# Patient Record
Sex: MALE | Race: WHITE | Employment: OTHER | URBAN - METROPOLITAN AREA
[De-identification: names, ages, dates, MRNs, and addresses within clinical notes are randomized per-mention and may not be internally consistent; named-entity substitution may affect disease eponyms.]

---

## 2019-04-01 ENCOUNTER — HOSPITAL ENCOUNTER (OUTPATIENT)
Dept: GENERAL RADIOLOGY | Age: 59
Discharge: HOME | End: 2019-04-01
Payer: MEDICARE

## 2019-04-01 ENCOUNTER — HOSPITAL ENCOUNTER (OUTPATIENT)
Age: 59
Discharge: HOME | End: 2019-04-01
Payer: MEDICARE

## 2019-04-01 DIAGNOSIS — G89.29 CHRONIC PAIN OF RIGHT KNEE: ICD-10-CM

## 2019-04-01 DIAGNOSIS — R06.2 WHEEZING: ICD-10-CM

## 2019-04-01 DIAGNOSIS — M25.561 CHRONIC PAIN OF RIGHT KNEE: ICD-10-CM

## 2019-04-01 PROCEDURE — 73560 X-RAY EXAM OF KNEE 1 OR 2: CPT

## 2019-04-01 PROCEDURE — 71046 X-RAY EXAM CHEST 2 VIEWS: CPT

## 2019-04-18 ENCOUNTER — OFFICE VISIT (OUTPATIENT)
Dept: ORTHOPEDIC SURGERY | Age: 59
End: 2019-04-18
Payer: MEDICARE

## 2019-04-18 VITALS — WEIGHT: 175 LBS | RESPIRATION RATE: 12 BRPM | HEIGHT: 67 IN | BODY MASS INDEX: 27.47 KG/M2

## 2019-04-18 DIAGNOSIS — M23.91 LOCKING OF RIGHT KNEE: ICD-10-CM

## 2019-04-18 DIAGNOSIS — S83.241A ACUTE MEDIAL MENISCUS TEAR OF RIGHT KNEE, INITIAL ENCOUNTER: Primary | ICD-10-CM

## 2019-04-18 DIAGNOSIS — M25.561 ACUTE PAIN OF RIGHT KNEE: ICD-10-CM

## 2019-04-18 PROCEDURE — 99243 OFF/OP CNSLTJ NEW/EST LOW 30: CPT | Performed by: ORTHOPAEDIC SURGERY

## 2019-04-18 RX ORDER — IBUPROFEN 800 MG/1
1 TABLET ORAL
COMMUNITY
Start: 2019-04-01 | End: 2019-11-07

## 2019-04-18 NOTE — PROGRESS NOTES
KNEE VISIT      HISTORY OF PRESENT ILLNESS    Martinez Leon is a 60 y.o. male who presents for consultation at request of Cindy Morgan PA-C, for right knee pain. He's had for last few weeks. He said he had an injury to his knee in 1965, but over the last few weeks it had increasingly severe pain which is constant. There is grade 10 over 10 and worsening and accompanied by stiffness swelling instability and weakness. He's been using a crutch, get around, straining his leg. ROS    Well documented in patient history form dated 4/18/19  All other ROS negative except for above. Past Surgical history    No past surgical history on file. PAST MEDICAL    No past medical history on file. Allergies    No Known Allergies    Meds    Current Outpatient Medications   Medication Sig Dispense Refill    ibuprofen (IBU) 800 MG tablet Take 1 tablet by mouth       No current facility-administered medications for this visit.         Social    Social History     Socioeconomic History    Marital status:      Spouse name: Not on file    Number of children: Not on file    Years of education: Not on file    Highest education level: Not on file   Occupational History    Not on file   Social Needs    Financial resource strain: Not on file    Food insecurity:     Worry: Not on file     Inability: Not on file    Transportation needs:     Medical: Not on file     Non-medical: Not on file   Tobacco Use    Smoking status: Not on file   Substance and Sexual Activity    Alcohol use: Not on file    Drug use: Not on file    Sexual activity: Not on file   Lifestyle    Physical activity:     Days per week: Not on file     Minutes per session: Not on file    Stress: Not on file   Relationships    Social connections:     Talks on phone: Not on file     Gets together: Not on file     Attends Sikh service: Not on file     Active member of club or organization: Not on file     Attends meetings of clubs or organizations: Not on file     Relationship status: Not on file    Intimate partner violence:     Fear of current or ex partner: Not on file     Emotionally abused: Not on file     Physically abused: Not on file     Forced sexual activity: Not on file   Other Topics Concern    Not on file   Social History Narrative    Not on file       Family HISTORY    No family history on file. PHYSICAL EXAM    Vital Signs:  Resp 12   Ht 5' 7\" (1.702 m)   Wt 175 lb (79.4 kg)   BMI 27.41 kg/m²   General Appearance:  Normal body habitus. Alert and oriented to person, place, and time. Affect:  Normal.   Gait:  Very antalgic flexed knee gait, using a crutch. Good balance and coordination. Skin:  Intact. Sensation: Limited by pain. Strength:  Intact. Reflexes:  Intact. Pulses:  Intact. Knee Exam:    Effusion:  2+    Range of Motion Right Left   Extension -10 0   Flexion 80 135     Provocative Test Right Left    Positive Negative Positive Negative   Anterior drawer [] [x] [] [x]   Lachman [] [x] [] [x]   Posterior drawer [] [x] [] [x]   Varus testing [x] [] [] [x]   Valgus testing [x] [] [] [x]   Joint line tenderness [x] [] [] [x]     Additional Exam Comments:  His neurocirculatory lymphatic exam otherwise is normal symmetric both lower extremities. He has exquisite medial joint line tenderness with crepitus consistent with a medial meniscus tear. He has excruciating pain with attempts at extension past -10 and flexion past 80. He also has exquisite pain with Radha's maneuver and valgus stress. All is consistent with medial meniscus tearing. IMAGING STUDIES    X-rays 2 views right knee taken to [facility] were unremarkable    IMPRESSION    Right knee pain secondary to acute medial meniscus tear with locking    PLAN      1. Conservative care options including physical therapy, NSAIDs, bracing, and activity modification were discussed.    2.  The indications for therapeutic injections were discussed. 3.  The indications for additional imaging studies were discussed. 4.  After considering the various options discussed, the patient elected to pursue a course that includes keeping him on crutches, and have him continue with his anti-inflammatory which is helping minimally. He should obtain an MRI of the right knee and return after testing for disposition. I would anticipate based upon my exam and his history that he will undoubtedly benefit from arthroscopic intervention. I do not feel that injections will be in his best interest as it would only delay the ability for me to perform surgery if warranted.

## 2019-04-29 DIAGNOSIS — M23.91 LOCKING OF RIGHT KNEE: ICD-10-CM

## 2019-04-29 DIAGNOSIS — M25.561 ACUTE PAIN OF RIGHT KNEE: ICD-10-CM

## 2019-04-29 DIAGNOSIS — S83.241A ACUTE MEDIAL MENISCUS TEAR OF RIGHT KNEE, INITIAL ENCOUNTER: Primary | ICD-10-CM

## 2019-07-05 ENCOUNTER — OFFICE VISIT (OUTPATIENT)
Dept: ORTHOPEDIC SURGERY | Age: 59
End: 2019-07-05
Payer: MEDICARE

## 2019-07-05 DIAGNOSIS — M23.91 LOCKING OF RIGHT KNEE: ICD-10-CM

## 2019-07-05 DIAGNOSIS — S83.241A ACUTE MEDIAL MENISCUS TEAR OF RIGHT KNEE, INITIAL ENCOUNTER: Primary | ICD-10-CM

## 2019-07-05 DIAGNOSIS — M25.561 ACUTE PAIN OF RIGHT KNEE: ICD-10-CM

## 2019-07-05 PROCEDURE — 4004F PT TOBACCO SCREEN RCVD TLK: CPT | Performed by: ORTHOPAEDIC SURGERY

## 2019-07-05 PROCEDURE — G8427 DOCREV CUR MEDS BY ELIG CLIN: HCPCS | Performed by: ORTHOPAEDIC SURGERY

## 2019-07-05 PROCEDURE — 99213 OFFICE O/P EST LOW 20 MIN: CPT | Performed by: ORTHOPAEDIC SURGERY

## 2019-07-05 PROCEDURE — 3017F COLORECTAL CA SCREEN DOC REV: CPT | Performed by: ORTHOPAEDIC SURGERY

## 2019-07-05 PROCEDURE — G8419 CALC BMI OUT NRM PARAM NOF/U: HCPCS | Performed by: ORTHOPAEDIC SURGERY

## 2019-07-05 RX ORDER — ALBUTEROL SULFATE 90 UG/1
AEROSOL, METERED RESPIRATORY (INHALATION)
COMMUNITY
Start: 2019-05-16

## 2019-07-05 RX ORDER — KETOROLAC TROMETHAMINE 10 MG/1
10 TABLET, FILM COATED ORAL EVERY 6 HOURS PRN
Qty: 20 TABLET | Refills: 0 | Status: SHIPPED | OUTPATIENT
Start: 2019-07-05 | End: 2019-11-07

## 2019-07-05 NOTE — PROGRESS NOTES
KNEE VISIT      HISTORY OF PRESENT ILLNESS    Grzegorz Wilkes is a 60 y.o. male who presents for reevaluation of his right knee. The character and intensity of the pain remain the same. He has had his MRI. He is here for review and disposition. ROS    Well documented in patient history form dated 4/18/19  All other ROS negative except for above. Past Surgical history    No past surgical history on file. PAST MEDICAL    No past medical history on file. Allergies    No Known Allergies    Meds    Current Outpatient Medications   Medication Sig Dispense Refill    albuterol sulfate  (90 Base) MCG/ACT inhaler       ibuprofen (IBU) 800 MG tablet Take 1 tablet by mouth       No current facility-administered medications for this visit.         Social    Social History     Socioeconomic History    Marital status:      Spouse name: Not on file    Number of children: Not on file    Years of education: Not on file    Highest education level: Not on file   Occupational History    Not on file   Social Needs    Financial resource strain: Not on file    Food insecurity:     Worry: Not on file     Inability: Not on file    Transportation needs:     Medical: Not on file     Non-medical: Not on file   Tobacco Use    Smoking status: Not on file   Substance and Sexual Activity    Alcohol use: Not on file    Drug use: Not on file    Sexual activity: Not on file   Lifestyle    Physical activity:     Days per week: Not on file     Minutes per session: Not on file    Stress: Not on file   Relationships    Social connections:     Talks on phone: Not on file     Gets together: Not on file     Attends Jewish service: Not on file     Active member of club or organization: Not on file     Attends meetings of clubs or organizations: Not on file     Relationship status: Not on file    Intimate partner violence:     Fear of current or ex partner: Not on file     Emotionally abused: Not on file

## 2019-07-05 NOTE — LETTER
[facility]  [address]  Phone: [phone]  Fax: [phone]    Meng Agrawal MD        2019    Brandi Sharp  [facility] & [address] [address]   1960  DOS 2019      KNEE VISIT      HISTORY OF PRESENT ILLNESS    Brandi Sharp is a 60 y.o. male who presents for reevaluation of his right knee. The character and intensity of the pain remain the same. He has had his MRI. He is here for review and disposition. ROS    Well documented in patient history form dated 19  All other ROS negative except for above. Past Surgical history    No past surgical history on file. PAST MEDICAL    No past medical history on file. Allergies    No Known Allergies    Meds    Current Outpatient Medications   Medication Sig Dispense Refill    albuterol sulfate  (90 Base) MCG/ACT inhaler       ibuprofen (IBU) 800 MG tablet Take 1 tablet by mouth       No current facility-administered medications for this visit.         Social    Social History     Socioeconomic History    Marital status:      Spouse name: Not on file    Number of children: Not on file    Years of education: Not on file    Highest education level: Not on file   Occupational History    Not on file   Social Needs    Financial resource strain: Not on file    Food insecurity:     Worry: Not on file     Inability: Not on file    Transportation needs:     Medical: Not on file     Non-medical: Not on file   Tobacco Use    Smoking status: Not on file   Substance and Sexual Activity    Alcohol use: Not on file    Drug use: Not on file    Sexual activity: Not on file   Lifestyle    Physical activity:     Days per week: Not on file     Minutes per session: Not on file    Stress: Not on file   Relationships    Social connections:     Talks on phone: Not on file     Gets together: Not on file     Attends Yazdanism service: Not on file and activity modification were discussed. 2.  The indications for therapeutic injections were discussed. 3.  The indications for additional imaging studies were discussed. 4.  After considering the various options discussed, the patient elected to pursue a course that includes proceeding with arthroscopic intervention to the right knee to include partial medial meniscectomy. INFORMED CONSENT NOTE        We discussed the risks, benefits, and alternatives to the proposed procedure, as well as the necessity of other members of the healthcare team participating in the procedure. All questions were answered and the patient elected to proceed with the proposed procedure and signed the informed consent form. Alvina Jaimes.  MD Miguel A Davila MD

## 2019-10-29 ENCOUNTER — OFFICE VISIT (OUTPATIENT)
Dept: PULMONOLOGY | Age: 59
End: 2019-10-29
Payer: MEDICAID

## 2019-10-29 VITALS
OXYGEN SATURATION: 99 % | WEIGHT: 171.1 LBS | RESPIRATION RATE: 17 BRPM | BODY MASS INDEX: 26.85 KG/M2 | HEIGHT: 67 IN | HEART RATE: 68 BPM

## 2019-10-29 DIAGNOSIS — R05.3 CHRONIC COUGH: Primary | ICD-10-CM

## 2019-10-29 DIAGNOSIS — R06.02 SHORTNESS OF BREATH: ICD-10-CM

## 2019-10-29 PROCEDURE — 99244 OFF/OP CNSLTJ NEW/EST MOD 40: CPT | Performed by: INTERNAL MEDICINE

## 2019-10-29 RX ORDER — PREDNISONE 10 MG/1
TABLET ORAL
Qty: 120 TABLET | Refills: 0 | Status: SHIPPED | OUTPATIENT
Start: 2019-10-29

## 2019-10-29 RX ORDER — OMEPRAZOLE 20 MG/1
20 CAPSULE, DELAYED RELEASE ORAL DAILY
Qty: 30 CAPSULE | Refills: 3 | Status: SHIPPED | OUTPATIENT
Start: 2019-10-29

## 2019-11-07 ENCOUNTER — TELEPHONE (OUTPATIENT)
Dept: PULMONOLOGY | Age: 59
End: 2019-11-07

## 2019-11-07 ENCOUNTER — OFFICE VISIT (OUTPATIENT)
Dept: PULMONOLOGY | Age: 59
End: 2019-11-07
Payer: MEDICAID

## 2019-11-07 VITALS
WEIGHT: 171.6 LBS | BODY MASS INDEX: 26.93 KG/M2 | SYSTOLIC BLOOD PRESSURE: 118 MMHG | HEIGHT: 67 IN | RESPIRATION RATE: 20 BRPM | HEART RATE: 78 BPM | DIASTOLIC BLOOD PRESSURE: 62 MMHG | OXYGEN SATURATION: 98 %

## 2019-11-07 DIAGNOSIS — R05.3 CHRONIC COUGH: Primary | ICD-10-CM

## 2019-11-07 PROCEDURE — 99214 OFFICE O/P EST MOD 30 MIN: CPT | Performed by: INTERNAL MEDICINE

## 2019-11-11 ENCOUNTER — HOSPITAL ENCOUNTER (OUTPATIENT)
Dept: CT IMAGING | Age: 59
Discharge: HOME | End: 2019-11-11
Payer: MEDICAID

## 2019-11-11 DIAGNOSIS — R05.3 CHRONIC COUGH: ICD-10-CM

## 2019-11-11 PROCEDURE — 71250 CT THORAX DX C-: CPT

## 2019-11-12 NOTE — TELEPHONE ENCOUNTER
Notes recorded by Elissa Mora MA on 11/11/2019 at 2:59 PM EST  Pt was informed of results with a verbal understanding.  ------    Notes recorded by Yury Peterson MA on 11/11/2019 at 2:56 PM EST  Spoke with [facility] requested for imaging to be pushed to PACS. Rashad Farmaria l with [facility] at radiology and requested comparison.  Watch for result .  ------    Notes recorded by Julia Stephens MD on 11/11/2019 at 2:37 PM EST  Tell pt there are no findings on his CT that explain his cough. He should have fiberoptic bronchoscopy as scheduled. Please get disk from [facility] and take to radiology for comparison.

## 2019-11-14 ENCOUNTER — HOSPITAL ENCOUNTER (OUTPATIENT)
Age: 59
Setting detail: OUTPATIENT SURGERY
Discharge: HOME | End: 2019-11-14
Attending: INTERNAL MEDICINE | Admitting: INTERNAL MEDICINE
Payer: MEDICAID

## 2019-11-14 VITALS
HEART RATE: 72 BPM | DIASTOLIC BLOOD PRESSURE: 56 MMHG | OXYGEN SATURATION: 97 % | WEIGHT: 170 LBS | SYSTOLIC BLOOD PRESSURE: 107 MMHG | BODY MASS INDEX: 26.68 KG/M2 | HEIGHT: 67 IN | TEMPERATURE: 97.8 F | RESPIRATION RATE: 16 BRPM

## 2019-11-14 LAB
APPEARANCE BAL (LAVAGE): ABNORMAL
CLOT EVALUATION BAL: ABNORMAL
COLOR LAVAGE: ABNORMAL
LYMPHOCYTES, BAL: 20 % (ref 5–10)
MACROPHAGES, BAL: 20 % (ref 90–95)
NUMBER OF CELLS COUNTED BAL (LAVAGE): 100
RBC, BAL: ABNORMAL /CUMM
SEGMENTED NEUTROPHILS, BAL: 60 % (ref 5–10)
WBC/EPI CELLS BAL: 130 /CUMM

## 2019-11-14 PROCEDURE — 3609011100 HC BRONCHOSCOPY BRUSHINGS: Performed by: INTERNAL MEDICINE

## 2019-11-14 PROCEDURE — 7100000010 HC PHASE II RECOVERY - FIRST 15 MIN: Performed by: INTERNAL MEDICINE

## 2019-11-14 PROCEDURE — 87015 SPECIMEN INFECT AGNT CONCNTJ: CPT

## 2019-11-14 PROCEDURE — 99152 MOD SED SAME PHYS/QHP 5/>YRS: CPT | Performed by: INTERNAL MEDICINE

## 2019-11-14 PROCEDURE — 88305 TISSUE EXAM BY PATHOLOGIST: CPT

## 2019-11-14 PROCEDURE — 31623 DX BRONCHOSCOPE/BRUSH: CPT | Performed by: INTERNAL MEDICINE

## 2019-11-14 PROCEDURE — 31625 BRONCHOSCOPY W/BIOPSY(S): CPT | Performed by: INTERNAL MEDICINE

## 2019-11-14 PROCEDURE — 87206 SMEAR FLUORESCENT/ACID STAI: CPT

## 2019-11-14 PROCEDURE — 88342 IMHCHEM/IMCYTCHM 1ST ANTB: CPT

## 2019-11-14 PROCEDURE — 87070 CULTURE OTHR SPECIMN AEROBIC: CPT

## 2019-11-14 PROCEDURE — 87102 FUNGUS ISOLATION CULTURE: CPT

## 2019-11-14 PROCEDURE — 89051 BODY FLUID CELL COUNT: CPT

## 2019-11-14 PROCEDURE — 7100000011 HC PHASE II RECOVERY - ADDTL 15 MIN: Performed by: INTERNAL MEDICINE

## 2019-11-14 PROCEDURE — 87205 SMEAR GRAM STAIN: CPT

## 2019-11-14 PROCEDURE — 2709999900 HC NON-CHARGEABLE SUPPLY: Performed by: INTERNAL MEDICINE

## 2019-11-14 PROCEDURE — 88341 IMHCHEM/IMCYTCHM EA ADD ANTB: CPT

## 2019-11-14 PROCEDURE — 87077 CULTURE AEROBIC IDENTIFY: CPT

## 2019-11-14 PROCEDURE — 88112 CYTOPATH CELL ENHANCE TECH: CPT

## 2019-11-14 PROCEDURE — 31624 DX BRONCHOSCOPE/LAVAGE: CPT | Performed by: INTERNAL MEDICINE

## 2019-11-14 PROCEDURE — 99153 MOD SED SAME PHYS/QHP EA: CPT | Performed by: INTERNAL MEDICINE

## 2019-11-14 PROCEDURE — 3609010800 HC BRONCHOSCOPY ALVEOLAR LAVAGE: Performed by: INTERNAL MEDICINE

## 2019-11-14 PROCEDURE — 3609011300 HC BRONCHOSCOPY BRONCHIAL/ENDOBRNCL BX 1+ SITES: Performed by: INTERNAL MEDICINE

## 2019-11-14 PROCEDURE — 87116 MYCOBACTERIA CULTURE: CPT

## 2019-11-14 PROCEDURE — 87186 SC STD MICRODIL/AGAR DIL: CPT

## 2019-11-14 PROCEDURE — 88312 SPECIAL STAINS GROUP 1: CPT

## 2019-11-14 PROCEDURE — 6360000002 HC RX W HCPCS: Performed by: INTERNAL MEDICINE

## 2019-11-14 RX ORDER — FENTANYL CITRATE 50 UG/ML
INJECTION, SOLUTION INTRAMUSCULAR; INTRAVENOUS PRN
Status: DISCONTINUED | OUTPATIENT
Start: 2019-11-14 | End: 2019-11-14 | Stop reason: ALTCHOICE

## 2019-11-14 RX ORDER — MIDAZOLAM HYDROCHLORIDE 5 MG/ML
INJECTION INTRAMUSCULAR; INTRAVENOUS PRN
Status: DISCONTINUED | OUTPATIENT
Start: 2019-11-14 | End: 2019-11-14 | Stop reason: ALTCHOICE

## 2019-11-14 ASSESSMENT — PAIN SCALES - GENERAL
PAINLEVEL_OUTOF10: 0

## 2019-11-14 ASSESSMENT — PAIN - FUNCTIONAL ASSESSMENT: PAIN_FUNCTIONAL_ASSESSMENT: 0-10

## 2019-11-16 LAB
CULTURE, RESPIRATORY: ABNORMAL
GRAM STAIN RESULT: ABNORMAL
GRAM STAIN RESULT: ABNORMAL
ORGANISM: ABNORMAL
ORGANISM: ABNORMAL

## 2019-11-16 RX ORDER — CEFDINIR 300 MG/1
300 CAPSULE ORAL 2 TIMES DAILY
Qty: 14 CAPSULE | Refills: 0 | Status: SHIPPED | OUTPATIENT
Start: 2019-11-16 | End: 2019-11-23

## 2019-11-21 NOTE — TELEPHONE ENCOUNTER
Spoke to [facility] at Dr. Yovani Calvillo office. States patient is scheduled for 11/25/19. Patient does not have insurance and may be an issue for patient(can't be seen per office). Patient is aware and working with  to get assistance.

## 2019-12-10 NOTE — TELEPHONE ENCOUNTER
Left message with Ayush Velásquez to follow up if patient had surgery on 12/5/19 as planned. Office has not received notes to date from Dr. Ranjeet Benavides office.

## 2019-12-16 LAB
FUNGUS (MYCOLOGY) CULTURE: ABNORMAL
FUNGUS (MYCOLOGY) CULTURE: NORMAL
FUNGUS STAIN: ABNORMAL
FUNGUS STAIN: NORMAL
ORGANISM: ABNORMAL

## 2019-12-31 LAB
AFB CULTURE (MYCOBACTERIA): NORMAL
AFB CULTURE (MYCOBACTERIA): NORMAL
AFB SMEAR: NORMAL
AFB SMEAR: NORMAL

## 2020-01-16 NOTE — TELEPHONE ENCOUNTER
Received records and scanned into epic bronch completed on 1/20/20. Patient does not have future follow up scheduled to date. Okay to close encounter?

## 2020-08-24 ENCOUNTER — HOSPITAL ENCOUNTER (EMERGENCY)
Age: 60
Discharge: HOME | End: 2020-08-25
Attending: EMERGENCY MEDICINE
Payer: COMMERCIAL

## 2020-08-24 LAB
ANISOCYTOSIS: ABNORMAL
APTT: 25.4 SEC (ref 24.2–36.2)
HCT VFR BLD CALC: 23.3 % (ref 40.5–52.5)
HEMATOLOGY PATH CONSULT: YES
HEMOGLOBIN: 8.2 G/DL (ref 13.5–17.5)
INR BLD: 0.91 (ref 0.86–1.14)
MCH RBC QN AUTO: 32 PG (ref 26–34)
MCHC RBC AUTO-ENTMCNC: 35.2 G/DL (ref 31–36)
MCV RBC AUTO: 91 FL (ref 80–100)
PDW BLD-RTO: 19.5 % (ref 12.4–15.4)
PLATELET # BLD: 14 K/UL (ref 135–450)
PMV BLD AUTO: 11.1 FL (ref 5–10.5)
POIKILOCYTES: ABNORMAL
PROTHROMBIN TIME: 10.6 SEC (ref 10–13.2)
RBC # BLD: 2.56 M/UL (ref 4.2–5.9)
WBC # BLD: 0.4 K/UL (ref 4–11)

## 2020-08-24 PROCEDURE — 85025 COMPLETE CBC W/AUTO DIFF WBC: CPT

## 2020-08-24 PROCEDURE — 85610 PROTHROMBIN TIME: CPT

## 2020-08-24 PROCEDURE — 86900 BLOOD TYPING SEROLOGIC ABO: CPT

## 2020-08-24 PROCEDURE — 99283 EMERGENCY DEPT VISIT LOW MDM: CPT

## 2020-08-24 PROCEDURE — 86850 RBC ANTIBODY SCREEN: CPT

## 2020-08-24 PROCEDURE — 85730 THROMBOPLASTIN TIME PARTIAL: CPT

## 2020-08-24 PROCEDURE — 86901 BLOOD TYPING SEROLOGIC RH(D): CPT

## 2020-08-25 ENCOUNTER — APPOINTMENT (OUTPATIENT)
Dept: GENERAL RADIOLOGY | Age: 60
End: 2020-08-25
Payer: COMMERCIAL

## 2020-08-25 VITALS
OXYGEN SATURATION: 97 % | RESPIRATION RATE: 16 BRPM | HEART RATE: 105 BPM | SYSTOLIC BLOOD PRESSURE: 118 MMHG | DIASTOLIC BLOOD PRESSURE: 75 MMHG | TEMPERATURE: 98.8 F

## 2020-08-25 LAB
ABO/RH: NORMAL
ANTIBODY SCREEN: NORMAL
ANTIBODY SCREEN: NORMAL
HEMATOLOGY PATH CONSULT: NORMAL

## 2020-08-25 PROCEDURE — 71045 X-RAY EXAM CHEST 1 VIEW: CPT

## 2020-08-25 NOTE — ED PROVIDER NOTES
[address] [facility] ED  eMERGENCY dEPARTMENT eNCOUnter      Pt Name: Juliane Miller  MRN: [id number]  Date of evaluation: 8/24/2020      Chief Complaint:    Chief Complaint   Patient presents with    Vascular Access Problem     Pt received chemo today at [facility], came home and PICC line started bleeding. HPI:  This is a  63 y.o. male with a history of leukemia who presents to the emergency department via private vehicle patient comes in with a complaint of bleeding to the right upper extremity PICC line that began after he finished chemotherapy at [facility] today. Physical Exam: (Pertinent Negatives and Positives)      GENERAL APPEARANCE: Awake and alert. Cooperative. No acute distress. Mild jaundice. HEAD: Normocephalic. Atraumatic. EYES: PERRL. EOM's grossly intact. ENT: Mucous membranes are moist.   NECK: Supple. Normal ROM. CHEST: Equal symmetric chest rise. RRR  LUNGS: Breathing is unlabored. Speaking comfortably in full sentences. LCAB  Abdomen: Nondistended  EXTREMITIES: MAEE. No acute deformities. Right upper extremity upper arm with PICC line with bleeding contained under Tegaderm dressing. SKIN: Warm and dry. NEUROLOGICAL: Alert and oriented. Strength is 5/5 in all extremities and sensation is intact. PLAN: Ordered initial CBC coags and type and screen anticipating that provider who picks up this patient may decide that the patient needs further labs ordered. LABS:   Labs Reviewed   CBC WITH AUTO DIFFERENTIAL   PROTIME-INR   APTT   TYPE AND SCREEN     Radiology Studies:   No orders to display       Plan: Suzette Harkins will complete work-up and disposition planning. Patient was seen by me in triage.  Please see the full history, physical and final disposition note by the other provider in main emergency department    (Please note sections of this note were completed with a voice recognition program.  Efforts were made to edit the dictations but occasionally words are mis-transcribed.)    Electronically signed, Kei Louie PA-C,         Kei Louie PA-C  08/24/20 [id number]

## 2020-08-25 NOTE — ED PROVIDER NOTES
delayed release capsule Take 1 capsule by mouth Daily 10/29/19   Mendel Sanabria MD   albuterol sulfate  (90 Base) MCG/ACT inhaler  19   Historical Provider, MD       Allergies as of 2020    (No Known Allergies)       Past Medical History:   Diagnosis Date    Arthritis     Leukemia [facility])         Surgical History:   Past Surgical History:   Procedure Laterality Date    ANKLE SURGERY      BRONCHOSCOPY N/A 2019    BRONCHOSCOPY ALVEOLAR LAVAGE performed by Mendel Sanabria MD at [address] [place] [address]  2019    BRONCHOSCOPY BIOPSY BRONCHUS performed by Mendel Sanabria MD at [address] [place] [address]  2019    BRONCHOSCOPY BRUSHINGS performed by Mendel Sanabria MD at [place] [address]          Family History:    Family History   Problem Relation Age of Onset    Sleep Apnea Father        Social History     Socioeconomic History    Marital status:      Spouse name: Not on file    Number of children: Not on file    Years of education: Not on file    Highest education level: Not on file   Occupational History    Not on file   Social Needs    Financial resource strain: Not on file    Food insecurity     Worry: Not on file     Inability: Not on file    Transportation needs     Medical: Not on file     Non-medical: Not on file   Tobacco Use    Smoking status: Former Smoker     Packs/day: 0.50     Years: 5.00     Pack years: 2.50     Types: Cigarettes     Last attempt to quit: 1985     Years since quittin.6    Smokeless tobacco: Never Used    Tobacco comment: pt states he was not a heavy smoker, pt states he smoked on and off    Substance and Sexual Activity    Alcohol use: Not Currently    Drug use: Not Currently     Types: Marijuana    Sexual activity: Not on file   Lifestyle    Physical activity     Days per week: Not on file     Minutes per session: Not on file    Stress: Not on file   Relationships    Social connections     Talks on phone: Not on file     Gets together: Not on file     Attends Mu-ism service: Not on file     Active member of club or organization: Not on file     Attends meetings of clubs or organizations: Not on file     Relationship status: Not on file    Intimate partner violence     Fear of current or ex partner: Not on file     Emotionally abused: Not on file     Physically abused: Not on file     Forced sexual activity: Not on file   Other Topics Concern    Not on file   Social History Narrative    Not on file       Nursing notes reviewed. ED Triage Vitals [08/24/20 2151]   Enc Vitals Group      BP (!) 107/54      Pulse 99      Resp 16      Temp 99.8 °F (37.7 °C)      Temp Source Oral      SpO2 97 %      Weight       Height       Head Circumference       Peak Flow       Pain Score       Pain Loc       Pain Edu? Excl. in [address]? GENERAL:    Awake, alert. Well developed, well nourished with no apparent distress. Nontoxic-appearing, ill-appearing. HENT:    Normocephalic, Atraumatic, no lacerations. No ENT exam due to PPE. EYES:    Conjunctiva normal,   Pupils equal round and reactive to light,   Extraocular movements normal.  NECK:    Trachea is midline. No stridor. CHEST:    Regular rate and regular rhythm, no murmurs/rubs/gallops,   normal S1/S2, chest wall non-tender. LUNGS:    No respiratory distress. No abdominal retractions, no sternal retractions. Clear to auscultation bilaterally, no wheezing, no rhochi, no rales  Speaking comfortably in full sentences  ABDOMEN:    Soft, non-tender, non-distended. No guarding. No rebound. EXTREMITIES:   Moves extremities x4 with purpose. Normal range of motion, no edema,   no tenderness, no deformity,   distal pulses present and equal bilaterally.     On the right arm there is a PICC line in place, there is some blood underneath the Tegaderm and soaking the blue [place] however did not appear to be any active bleeding, palpation of the right upper extremity does not reveal any indurated compartments, the tissue is soft. There is no pain or tenderness associated palpation over the PICC line area  SKIN:    Warm, dry and intact. NEUROLOGIC:  Normal mental status. Moving all extremities to command. Alert and oriented x4   without focal motor deficit or gross sensory deficit. Normal speech. PSYCHIATRIC:  Not anxious,   normal mood and affect,   thoughts are linear and organized,   without delusions/hallucinations,   responds appropriately to questions      LABS and DIAGNOSTIC RESULTS    RADIOLOGY  X-RAYS:  I have reviewed radiologic plain film image(s). ALL OTHER NON-PLAIN FILM IMAGES SUCH AS CT, ULTRASOUND AND MRI HAVE BEEN READ BY THE RADIOLOGIST. XR CHEST PORTABLE   Final Result   PICC in good position. No evidence of acute cardiopulmonary process.               Chest X-Ray  Interpreted by: Emergency Department Physician  View: Portable chest xray  Findings: No hemothorax, No pneumothorax, No masses, No effusion, Cardiomegaly, PICC line in position    LABS  Results for orders placed or performed during the hospital encounter of 08/24/20   CBC Auto Differential   Result Value Ref Range    WBC 0.4 (LL) 4.0 - 11.0 K/uL    RBC 2.56 (L) 4.20 - 5.90 M/uL    Hemoglobin 8.2 (L) 13.5 - 17.5 g/dL    Hematocrit 23.3 (L) 40.5 - 52.5 %    MCV 91.0 80.0 - 100.0 fL    MCH 32.0 26.0 - 34.0 pg    MCHC 35.2 31.0 - 36.0 g/dL    RDW 19.5 (H) 12.4 - 15.4 %    Platelets 14 (LL) 603 - 450 K/uL    MPV 11.1 (H) 5.0 - 10.5 fL    Path Consult Yes     Anisocytosis 1+ (A)     Poikilocytes 1+ (A)    Protime-INR   Result Value Ref Range    Protime 10.6 10.0 - 13.2 sec    INR 0.91 0.86 - 1.14   APTT   Result Value Ref Range    aPTT 25.4 24.2 - 36.2 sec   TYPE AND SCREEN   Result Value Ref Range    ABO/Rh O POS     Antibody Screen CANCELED    ANTIBODY SCREEN   Result Value Ref Range    Antibody Screen NEG      Comparisons to lab work done earlier today, WBC went from 0.6 to 0.4  Hemoglobin went from 8.7 to 8.2, hematocrit was at 24.6 earlier today now it is at 23.3, platelets was at 16 it is now at 14, differential was not done as WBCs was less than 0.5        Ld    Procedures/interventions/images ordered for this visit  Orders Placed This Encounter   Procedures    XR CHEST PORTABLE    CBC Auto Differential    Protime-INR    APTT    Please document vitals    Inpatient consult to Hem/Onc    TYPE AND SCREEN    Antibody Screen       Medications ordered for this visit  No orders of the defined types were placed in this encounter. ED course notes for this visit  ED Course as of Aug 25 0236   Tue Aug 25, 2020   0230 I explained to the patient the delay in the consult with the Hem/Onc. I reviewed the lab results with the patient. I checked the PICC line, there is a new dermatag on it, some dried blood on the device but no active bleeding. Upper arm continues to be soft without any signs of compartment syndrome. Patient does not want to wait for the consult from he, especially given that he has an appointment with them at 1 PM this afternoon    [SG]      ED Course User Index  [SG] Williams Gant MD     REVIEW OF PREVIOUS RECORDS  I have reviewed the old records of [facility] which reveal the following pertinent information:        CBC (08/24/2020 2:40 PM EDT)  Component Value   WBC 0.6 (LL)Comment: Specimen checked for clots. None detected.    RBC 2.70 (L)   Hemoglobin 8.7 (L)   Hematocrit 24.6 (L)   MCV 91.1   MCH 32.3   MCHC 35.4   RDW 19.3 (H)   Platelets 16 (LL)   MPV 10.7        Differential (08/24/2020 2:40 PM EDT)  Component Value   Neutrophils Relative 8.0 (L)   Lymphocytes Relative 92.0 (H)   Monocytes Relative 0.0   Eosinophils Relative 0.0   Basophils Relative 0.0   Neutrophils Absolute 48 (L)   Lymphocytes Absolute 552 (L)   Monocytes Absolute 0 (L)   Eosinophils Absolute 0 (L) Basophils Absolute 0   PLT Morphology Platelet morphology appears normal      Comprehensive metabolic panel (84/16/1729 2:40 PM EDT)  Component Value   Sodium 135   Potassium 3.9   Chloride 100   CO2 26   Anion Gap 9   BUN 20   Creatinine 0.84   Glucose 101 (H)   Calcium 8.8   Total Bilirubin 0.8   AST 41 (H)    (H)   Alkaline Phosphatase 62   Total Protein 6.8   Albumin 3.7   Osmolality, Calculated 283   eGFR NONAA CKD-EPI >90         I wore goggles, surgical mask, N95 mask and gloves when I evaluated the patient. I evaluated the patient in room 20/20    Nontoxic-appearing, afebrile, neurovascularly intact. There is no sign of compartment syndrome, abscess or necrotizing fasciitis. I completed a structured, evidence-based clinical evaluation to determine the need for hospitalization in this patient . The evidence indicates that the patient is low risk for failure of outpatient therapy. Hospitalization, even for a short period, carries significant risks, and the risk of further imaging or hospitalization is likely higher than the risk of the patient failing outpatient therapy. It is, therefore, in the patients best interest to not undergo further testing or be hospitalized. A shared decision making discussion was had with the patient, I discussed my clinical impression and the result of an evidence-based clinical evaluation to screen for outpatient treatment failure, as well as the risk of further testing and hospitalization. The evidence shows that the risk for failure of outpatient treatment is low. Although the risk of failure of outpatient treatment has not been excluded, the risks of further testing or hospitalization likely exceed the benefit, and the patient declines further testing or hospitalization. Patient verbalized comprehension of close follow-up and need for re-check in two days.  It is understood that if the patient is not improving as expected or if other new symptoms or signs of concern develop, other etiologies or diagnoses may need to be considered requiring other tests, treatments, consultations, and/or admission. The diagnosis, plan, expected course, need for abx, close follow-up, and return precautions were discussed and all questions were answered. Final Impression    1. Complication associated with peripherally inserted central catheter, initial encounter        Blood pressure 127/68, pulse 92, temperature 99.8 °F (37.7 °C), temperature source Oral, resp. rate 20, SpO2 98 %. Disposition  At this point I do not feel the patient requires further work up and it is reasonable to discharge the patient. I had a discussion with the patient and/or their surrogate regarding diagnosis, diagnostic testing results, treatment/ plan of care, and follow up. Patient and/or companions verbalized understanding of the ED workup, any relevant findings as well as any incidental findings, and the disposition and plan. There was shared decision-making between myself as well as the patient and/or their surrogate and we are all in agreement with discharge home. There was an opportunity for questions and all questions were answered to the best of my ability and to the satisfaction of the patient and/or patient family. Patient agreed to follow upas recommend for further evaluation/treatment. The patient was given strict return precautions as we discussed symptoms that would necessitate return to the ED. Patient will return to ED for new/worsening symptoms. The patient verbalized their understanding and agreement with the above plan. Please refer to AVS for further details regarding discharge instructions. Patient was given scripts for the following medications. I counseled patient how to take these medications. New Prescriptions    No medications on file       Pt is in stable condition upon Discharge to home. The note was completed using Dragon voice recognition transcription.  Every effort was made to ensure accuracy; however, inadvertent transcription errors may be present despite my best efforts to edit errors.     Aby Giles MD  [address] MD  08/25/20 2438

## 2020-08-25 NOTE — ED NOTES
0150 Call to transfer center spoke to Stephanie Buckner, sent out page to Hem/Onc     David Mallory  08/25/20 8537

## 2020-08-25 NOTE — ED NOTES
0236 Called to cancel hem/onc consult per Dr. Manuel Moss.      [employer]  08/25/20 0550

## 2020-08-25 NOTE — ED NOTES
Weight removed from right arm, bleeding appears to be controlled. Will continued to monitor.       Gus Marquez RN  08/24/20 4990

## 2021-06-22 ENCOUNTER — TELEPHONE (OUTPATIENT)
Dept: ORTHOPEDIC SURGERY | Age: 61
End: 2021-06-22

## (undated) DEVICE — SYRINGE MED 10ML SLIP TIP BLNT FILL AND LUERLOCK DISP

## (undated) DEVICE — BRUSH CYTO L150CM CHN L2MM BRIST DIA1.9MM PTFE S STL BULL

## (undated) DEVICE — SHEET,DRAPE,40X58,STERILE: Brand: MEDLINE

## (undated) DEVICE — SYRINGE MED 50ML LUERSLIP TIP

## (undated) DEVICE — DISPOSABLE BIOPSY FORCEPS: Brand: DISPOSABLE BIOPSY FORCEPS

## (undated) DEVICE — SINGLE USE BIOPSY VALVE MAJ-210: Brand: SINGLE USE BIOPSY VALVE (STERILE)

## (undated) DEVICE — SINGLE USE SUCTION VALVE MAJ-209: Brand: SINGLE USE SUCTION VALVE (STERILE)

## (undated) DEVICE — NEBULIZER AEROSOL TBNG 7 FT FOR ACUTE CARE NEBUTECH

## (undated) DEVICE — AIRLIFE™ ADULT AEROSOL MASK VINYL, UNDER-THE-CHIN STYLE: Brand: AIRLIFE™